# Patient Record
Sex: FEMALE | Race: WHITE
[De-identification: names, ages, dates, MRNs, and addresses within clinical notes are randomized per-mention and may not be internally consistent; named-entity substitution may affect disease eponyms.]

---

## 2018-09-25 ENCOUNTER — HOSPITAL ENCOUNTER (OUTPATIENT)
Dept: HOSPITAL 80 - FIMAGING | Age: 43
End: 2018-09-25
Attending: INTERNAL MEDICINE
Payer: COMMERCIAL

## 2018-09-25 DIAGNOSIS — Z80.3: ICD-10-CM

## 2018-09-25 DIAGNOSIS — N64.59: Primary | ICD-10-CM

## 2018-09-25 PROCEDURE — 0159T: CPT

## 2018-09-25 PROCEDURE — C8908 MRI W/O FOL W/CONT, BREAST,: HCPCS

## 2018-09-25 PROCEDURE — A9585 GADOBUTROL INJECTION: HCPCS

## 2018-09-25 PROCEDURE — 77059: CPT

## 2018-10-25 ENCOUNTER — HOSPITAL ENCOUNTER (OUTPATIENT)
Dept: HOSPITAL 80 - FIMAGING | Age: 43
End: 2018-10-25
Attending: PSYCHIATRY & NEUROLOGY
Payer: COMMERCIAL

## 2018-10-25 DIAGNOSIS — G40.909: Primary | ICD-10-CM

## 2018-11-09 ENCOUNTER — HOSPITAL ENCOUNTER (OUTPATIENT)
Dept: HOSPITAL 80 - FCPNEURO | Age: 43
End: 2018-11-09
Attending: PSYCHIATRY & NEUROLOGY
Payer: COMMERCIAL

## 2018-11-09 DIAGNOSIS — G40.909: Primary | ICD-10-CM

## 2018-11-09 NOTE — CPEEG
FOUR-HOUR VIDEO EEG



DATE OF STUDY:  11/09/2018





INTERPRETATION:  This 4-hour video EEG is abnormal due to the presence of  
generalized spike and wave discharges.  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 
a genetic generalized epilepsy.  The patient did not have any clinical events 
during the video EEG monitoring session.



REPORT:  This 4-hour video EEG contains well-formed 10 Hz alpha activity to the 
posterior head regions.  The primary feature of this recording was the presence 
of generalized spike and wave discharges.  During wakefulness, the discharges 
were 3-4 Hz in frequency, well-formed and monomorphic.  There was additional 
activation with hyperventilation.  There was no additional activation with 
photic stimulation.  As the patient became drowsy and fell into sustained sleep
, there was increased activation of generalized spike and wave discharges.  
During drowsiness and sleep, the discharges became more irregular in terms of 
morphology, frequency, and the additional presence of generalized polyspike and 
wave discharges.    There were no clinical events recorded during the video EEG 
monitoring session.





Job #:  [id number]

MTDD

## 2019-02-26 ENCOUNTER — HOSPITAL ENCOUNTER (OUTPATIENT)
Dept: HOSPITAL 80 - FIMAGING | Age: 44
End: 2019-02-26
Attending: INTERNAL MEDICINE
Payer: COMMERCIAL

## 2019-02-26 DIAGNOSIS — N92.1: ICD-10-CM

## 2019-02-26 DIAGNOSIS — Z80.3: ICD-10-CM

## 2019-02-26 DIAGNOSIS — Z12.31: Primary | ICD-10-CM
